# Patient Record
Sex: MALE | Race: WHITE | NOT HISPANIC OR LATINO | Employment: OTHER | URBAN - METROPOLITAN AREA
[De-identification: names, ages, dates, MRNs, and addresses within clinical notes are randomized per-mention and may not be internally consistent; named-entity substitution may affect disease eponyms.]

---

## 2017-02-14 ENCOUNTER — PATIENT MESSAGE (OUTPATIENT)
Dept: MEDICAL GROUP | Facility: MEDICAL CENTER | Age: 67
End: 2017-02-14

## 2017-02-14 DIAGNOSIS — E78.00 PURE HYPERCHOLESTEROLEMIA: ICD-10-CM

## 2017-02-15 RX ORDER — SIMVASTATIN 20 MG
20 TABLET ORAL EVERY EVENING
Qty: 90 TAB | Refills: 3 | Status: SHIPPED | OUTPATIENT
Start: 2017-02-15 | End: 2017-11-20 | Stop reason: SDUPTHER

## 2017-03-01 ENCOUNTER — OFFICE VISIT (OUTPATIENT)
Dept: MEDICAL GROUP | Facility: MEDICAL CENTER | Age: 67
End: 2017-03-01
Payer: COMMERCIAL

## 2017-03-01 VITALS
BODY MASS INDEX: 25.65 KG/M2 | SYSTOLIC BLOOD PRESSURE: 142 MMHG | OXYGEN SATURATION: 97 % | TEMPERATURE: 97.3 F | WEIGHT: 189.4 LBS | RESPIRATION RATE: 16 BRPM | DIASTOLIC BLOOD PRESSURE: 88 MMHG | HEART RATE: 78 BPM | HEIGHT: 72 IN

## 2017-03-01 DIAGNOSIS — Z87.891 HX OF TOBACCO USE, PRESENTING HAZARDS TO HEALTH: ICD-10-CM

## 2017-03-01 DIAGNOSIS — L30.9 CHRONIC ECZEMA: ICD-10-CM

## 2017-03-01 DIAGNOSIS — E55.9 VITAMIN D INSUFFICIENCY: ICD-10-CM

## 2017-03-01 DIAGNOSIS — E78.00 PURE HYPERCHOLESTEROLEMIA: ICD-10-CM

## 2017-03-01 DIAGNOSIS — Z00.00 ANNUAL PHYSICAL EXAM: ICD-10-CM

## 2017-03-01 DIAGNOSIS — M25.512 CHRONIC LEFT SHOULDER PAIN: ICD-10-CM

## 2017-03-01 DIAGNOSIS — G89.29 CHRONIC LEFT SHOULDER PAIN: ICD-10-CM

## 2017-03-01 PROCEDURE — 99396 PREV VISIT EST AGE 40-64: CPT | Performed by: FAMILY MEDICINE

## 2017-03-01 RX ORDER — DESONIDE 0.5 MG/G
CREAM TOPICAL 2 TIMES DAILY
COMMUNITY
End: 2019-03-06

## 2017-03-01 ASSESSMENT — PATIENT HEALTH QUESTIONNAIRE - PHQ9: CLINICAL INTERPRETATION OF PHQ2 SCORE: 0

## 2017-03-01 NOTE — MR AVS SNAPSHOT
"        Saul Bari Unique   3/1/2017 1:20 PM   Office Visit   MRN: [id number]    Department:  [facility]   Dept Phone:  [phone]    Description:  Male : 1950   Provider:  Naun Moody M.D.           Reason for Visit     Annual Exam           Allergies as of 3/1/2017     No Known Allergies      You were diagnosed with     Annual physical exam   [719254]       Pure hypercholesterolemia   [272.0.ICD-9-CM]       Chronic eczema   [526652]       Chronic left shoulder pain   [406152]       Vitamin D insufficiency   [609101]       Hx of tobacco use, presenting hazards to health   [940650]         Vital Signs     Blood Pressure Pulse Temperature Respirations Height Weight    142/88 mmHg 78 36.3 °C (97.3 °F) 16 1.829 m (6' 0.01\") 85.911 kg (189 lb 6.4 oz)    Body Mass Index Oxygen Saturation Smoking Status             25.68 kg/m2 97% Former Smoker         Basic Information     Date Of Birth Sex Race Ethnicity Preferred Language    1950 Male White Non- English      Problem List              ICD-10-CM Priority Class Noted - Resolved    Hyperlipidemia E78.5   3/6/2015 - Present    Chronic eczema L30.9   3/6/2015 - Present    Left shoulder pain M25.512   3/6/2015 - Present    Hx of tobacco use, presenting hazards to health Z87.891   3/6/2015 - Present    Vitamin D insufficiency E55.9   3/6/2015 - Present      Health Maintenance        Date Due Completion Dates    COLONOSCOPY 2002 ---    IMM INFLUENZA (1) 2016 ---    IMM DTaP/Tdap/Td Vaccine (2 - Td) 2026            Current Immunizations     SHINGLES VACCINE 3/15/2014    Tdap Vaccine 2016      Below and/or attached are the medications your provider expects you to take. Review all of your home medications and newly ordered medications with your provider and/or pharmacist. Follow medication instructions as directed by your provider and/or pharmacist. Please keep your medication list with you and share with your provider. " Update the information when medications are discontinued, doses are changed, or new medications (including over-the-counter products) are added; and carry medication information at all times in the event of emergency situations     Allergies:  No Known Allergies          Medications  Valid as of: March 01, 2017 -  1:44 PM    Generic Name Brand Name Tablet Size Instructions for use    Aspirin (Tablet Delayed Response) ECOTRIN 81 MG Take 1 Tab by mouth every day.        Cetirizine HCl (Tab) ZYRTEC 10 MG Take 1 Tab by mouth 1 time daily as needed (Eczema).        Cholecalciferol (Cap) Vitamin D 1000 UNIT Take 1 Cap by mouth every day.        Clotrimazole-Betamethasone (Cream) LOTRISONE 1-0.05 % Apply 1 Application to affected area(s) 2 times a day.        Desonide (Cream) TRIDESILON 0.05 % Apply  to affected area(s) 2 times a day.        Omega-3 Fatty Acids (Cap) Fish Oil 1200 MG Take 1 Cap by mouth 2 Times a Day.        Simvastatin (Tab) ZOCOR 20 MG Take 1 Tab by mouth every evening.        Triamcinolone Acetonide (Ointment) KENALOG 0.1 % Apply 1 Application to affected area(s) every day.        .                 Medicines prescribed today were sent to:     [facility] [address] [place] [address], [place] - [address]    [address] [place] [address]    Phone: [phone] Fax: [phone]    Open 24 Hours?: No      Medication refill instructions:       If your prescription bottle indicates you have medication refills left, it is not necessary to call your provider’s office. Please contact your pharmacy and they will refill your medication.    If your prescription bottle indicates you do not have any refills left, you may request refills at any time through one of the following ways: The online [employer] system (except Urgent Care), by calling your provider’s office, or by asking your pharmacy to contact your provider’s office with a refill request. Medication refills are processed only during regular business  hours and may not be available until the next business day. Your provider may request additional information or to have a follow-up visit with you prior to refilling your medication.   *Please Note: Medication refills are assigned a new Rx number when refilled electronically. Your pharmacy may indicate that no refills were authorized even though a new prescription for the same medication is available at the pharmacy. Please request the medicine by name with the pharmacy before contacting your provider for a refill.        Your To Do List     Future Labs/Procedures Complete By Expires    TASH ANTIBODY WITH REFLEX  As directed 3/2/2018    CBC WITH DIFFERENTIAL  As directed 3/2/2018    CCP ANTIBODY  As directed 3/2/2018    COMP METABOLIC PANEL  As directed 3/2/2018    LIPID PROFILE  As directed 3/2/2018    PROSTATE SPECIFIC AG SCREENING  As directed 3/2/2018    TSH WITH REFLEX TO FT4  As directed 3/1/2018    VITAMIN D,25 HYDROXY  As directed 3/2/2018         MyChart Access Code: Activation code not generated  Current [facility] Status: Active

## 2017-03-01 NOTE — PROGRESS NOTES
"Subjective:   Saul Calhoun is a 66 y.o. male here today for annual    Patient came back positive for warm agglutinins on blood donation. He denies any known autoimmune diseases. He is not on any known medications to cause positive warm agglutinins.      Hyperlipidemia  Patient is tolerating simvastatin 20 mg daily. He has not had labs done this year.    Left shoulder pain  If patient exercises, left shoulder pain is under control. He has been to physical therapy in the past.    Chronic eczema  Patient is using triamcinolone 0.1% ointment as needed for breakouts.    Vitamin D insufficiency  Patient is on vitamin D 1000 units daily.         Current medicines (including changes today)  Current Outpatient Prescriptions   Medication Sig Dispense Refill   • desonide (TRIDESILON) 0.05 % Cream Apply  to affected area(s) 2 times a day.     • simvastatin (ZOCOR) 20 MG Tab Take 1 Tab by mouth every evening. 90 Tab 3   • clotrimazole-betamethasone (LOTRISONE) 1-0.05 % Cream Apply 1 Application to affected area(s) 2 times a day. 1 Tube 2   • cetirizine (ZYRTEC) 10 MG TABS Take 1 Tab by mouth 1 time daily as needed (Eczema).     • triamcinolone acetonide (KENALOG) 0.1 % OINT Apply 1 Application to affected area(s) every day.     • Cholecalciferol (VITAMIN D) 1000 UNIT CAPS Take 1 Cap by mouth every day.     • aspirin EC (ECOTRIN) 81 MG TBEC Take 1 Tab by mouth every day.     • Omega-3 Fatty Acids (FISH OIL) 1200 MG CAPS Take 1 Cap by mouth 2 Times a Day.       No current facility-administered medications for this visit.     He  has a past medical history of Hyperlipidemia (3/6/2015) and Chronic eczema (3/6/2015).    ROS   No chest pain, no shortness of breath, no abdominal pain       Objective:     Blood pressure 142/88, pulse 78, temperature 36.3 °C (97.3 °F), resp. rate 16, height 1.829 m (6' 0.01\"), weight 85.911 kg (189 lb 6.4 oz), SpO2 97 %. Body mass index is 25.68 kg/(m^2).   Physical Exam:  Constitutional: Alert, " no distress.  Skin: Warm, dry, good turgor, no rashes in visible areas.  Eye: Equal, round and reactive, conjunctiva clear, lids normal.  ENMT: Lips without lesions, good dentition, oropharynx clear.  Neck: Trachea midline, no masses, no thyromegaly. No cervical or supraclavicular lymphadenopathy  Respiratory: Unlabored respiratory effort, lungs clear to auscultation, no wheezes, no ronchi.  Cardiovascular: Normal S1, S2, no murmur, no edema.  Abdomen: Soft, non-tender, no masses, no hepatosplenomegaly.  Psych: Alert and oriented x3, normal affect and mood.        Assessment and Plan:   The following treatment plan was discussed    1. Annual physical exam  Check labs and call with results.  We will check a autoimmune labs for positive warm agglutinins.  - COMP METABOLIC PANEL; Future  - CBC WITH DIFFERENTIAL; Future  - LIPID PROFILE; Future  - TSH WITH REFLEX TO FT4; Future  - VITAMIN D,25 HYDROXY; Future  - PROSTATE SPECIFIC AG SCREENING; Future  - CCP ANTIBODY; Future  - TASH ANTIBODY WITH REFLEX; Future    2. Pure hypercholesterolemia  Controlled. Continue current medication. Check labs and call with results.    3. Chronic eczema  Controlled. Continue current medication.     4. Chronic left shoulder pain  Controlled. Continue current exercise.    5. Vitamin D insufficiency  Continue current supplementation. Check labs    6. Hx of tobacco use, presenting hazards to health        Followup: Return in about 1 year (around 3/1/2018) for Annual, Long.

## 2017-03-01 NOTE — ASSESSMENT & PLAN NOTE
If patient exercises, left shoulder pain is under control. He has been to physical therapy in the past.

## 2017-03-13 ENCOUNTER — TELEPHONE (OUTPATIENT)
Dept: MEDICAL GROUP | Facility: MEDICAL CENTER | Age: 67
End: 2017-03-13

## 2017-03-13 NOTE — TELEPHONE ENCOUNTER
----- Message from Naun Moody M.D. sent at 3/13/2017 12:42 PM PDT -----  Please notify patient if his blood counts, vitamin D, thyroid function, kidney function, liver function normal.  There is no evidence of autoimmune disease on his labs.  His triglycerides are borderline high. We recommend decreasing saturated fat which are found in meats that come from a cow or pig, and found in creams, cheeses, butter, ann, and fried foods. We recommend more vegetables, fruits, fish, nuts, olive oil and exercising 5 times a week for 30 minutes.   Naun Moody M.D.

## 2017-03-26 ENCOUNTER — PATIENT MESSAGE (OUTPATIENT)
Dept: MEDICAL GROUP | Facility: MEDICAL CENTER | Age: 67
End: 2017-03-26

## 2017-03-26 NOTE — TELEPHONE ENCOUNTER
From: Saul Calhoun  To: Naun Moody M.D.  Sent: 3/26/2017 8:16 AM PDT  Subject: Test Result Question    I was wondering if my blood tests revealed why my [employer] donation was a Positive TOBY and rejected? Ref: Letter given to you during my annual exam.  Thank you,  Drake

## 2017-04-18 ENCOUNTER — TELEPHONE (OUTPATIENT)
Dept: MEDICAL GROUP | Facility: MEDICAL CENTER | Age: 67
End: 2017-04-18

## 2017-04-18 DIAGNOSIS — L30.9 ECZEMA, UNSPECIFIED TYPE: ICD-10-CM

## 2017-04-18 NOTE — TELEPHONE ENCOUNTER
----- Message from Michele Gilmore sent at 4/18/2017  8:24 AM PDT -----  Regarding: FW: Customer Service: Referrals  Contact: [phone]      ----- Message -----     From: Tevin Calhoun     Sent: 4/17/2017   6:41 PM       To: Amb All Mas  Subject: Customer Service: Referrals                      Customer Service request regarding: Referrals   regarding member: TEVIN CALHOUN [  [id number]]    -Note: Referrals are displayed in the format:Internal ID [External ID]    Referral #: [id number] [[id number]]    Comment:  Please let me know if my dermatology referral for [phone] was done at my last check up.  Thank you,  Drake Calhoun

## 2017-05-08 ENCOUNTER — TELEPHONE (OUTPATIENT)
Dept: MEDICAL GROUP | Facility: MEDICAL CENTER | Age: 67
End: 2017-05-08

## 2017-05-08 NOTE — TELEPHONE ENCOUNTER
----- Message from German John sent at 5/7/2017  5:00 PM PDT -----  Regarding: FW: Customer Service: Referrals  Contact: [phone]      ----- Message -----     From: Tevin Calhoun     Sent: 5/7/2017  10:31 AM       To: Jarad Mays Mas  Subject: Customer Service: Referrals                      Customer Service request regarding: Referrals   regarding member: TEVIN CALHOUN [  [id number]]    -Note: Referrals are displayed in the format:Internal ID [External ID]    Referral #: [id number] [[id number]]    Comment:  To keep my records accurate, this referral needs to be deleted and replaced with the referral you sent to [facility] on 4/24/17.  Thank you,  Drake Calhoun

## 2017-10-13 DIAGNOSIS — L30.9 CHRONIC ECZEMA: ICD-10-CM

## 2017-10-13 RX ORDER — CLOTRIMAZOLE AND BETAMETHASONE DIPROPIONATE 10; .64 MG/G; MG/G
CREAM TOPICAL
Qty: 30 G | Refills: 2 | Status: SHIPPED | OUTPATIENT
Start: 2017-10-13 | End: 2019-03-06

## 2017-11-10 ENCOUNTER — PATIENT MESSAGE (OUTPATIENT)
Dept: MEDICAL GROUP | Facility: MEDICAL CENTER | Age: 67
End: 2017-11-10

## 2017-11-10 NOTE — TELEPHONE ENCOUNTER
From: Saul Calhoun  To: Naun Moody M.D.  Sent: 11/10/2017 10:38 AM PST  Subject: Non-Urgent Medical Question    Please let us know if you think the [employer] Screening program is a worthwhile preventive screening program to participate in. We had it done back on 8-12-12 and are considering doing it again.  Thank you,  Drake & Anne-Marie Calhoun

## 2017-11-20 ENCOUNTER — PATIENT MESSAGE (OUTPATIENT)
Dept: MEDICAL GROUP | Facility: MEDICAL CENTER | Age: 67
End: 2017-11-20

## 2017-11-20 DIAGNOSIS — E78.00 PURE HYPERCHOLESTEROLEMIA: ICD-10-CM

## 2017-11-20 RX ORDER — SIMVASTATIN 20 MG
20 TABLET ORAL EVERY EVENING
Qty: 90 TAB | Refills: 3 | Status: SHIPPED | OUTPATIENT
Start: 2017-11-20 | End: 2018-11-12 | Stop reason: SDUPTHER

## 2017-11-20 NOTE — TELEPHONE ENCOUNTER
From: Saul Calhoun  To: Naun Moody M.D.  Sent: 11/20/2017 8:57 AM PST  Subject: Prescription Question    Can you transfer my prescription for Simvastatin 20 MG from [facility] to the [facility] on [address] [facility]?  Thank you,   Drake Calhoun

## 2018-03-06 ENCOUNTER — OFFICE VISIT (OUTPATIENT)
Dept: MEDICAL GROUP | Facility: MEDICAL CENTER | Age: 68
End: 2018-03-06
Payer: MEDICARE

## 2018-03-06 VITALS
RESPIRATION RATE: 14 BRPM | TEMPERATURE: 97.6 F | SYSTOLIC BLOOD PRESSURE: 136 MMHG | OXYGEN SATURATION: 95 % | WEIGHT: 184.2 LBS | DIASTOLIC BLOOD PRESSURE: 90 MMHG | HEART RATE: 89 BPM | HEIGHT: 72 IN | BODY MASS INDEX: 24.95 KG/M2

## 2018-03-06 DIAGNOSIS — Z97.4 HEARING AID WORN: ICD-10-CM

## 2018-03-06 DIAGNOSIS — I71.40 ABDOMINAL AORTIC ANEURYSM (AAA) WITHOUT RUPTURE (HCC): ICD-10-CM

## 2018-03-06 DIAGNOSIS — E55.9 VITAMIN D INSUFFICIENCY: ICD-10-CM

## 2018-03-06 DIAGNOSIS — E78.00 PURE HYPERCHOLESTEROLEMIA: ICD-10-CM

## 2018-03-06 DIAGNOSIS — L30.9 CHRONIC ECZEMA: ICD-10-CM

## 2018-03-06 DIAGNOSIS — Z23 NEED FOR VACCINATION: ICD-10-CM

## 2018-03-06 DIAGNOSIS — M25.512 CHRONIC LEFT SHOULDER PAIN: ICD-10-CM

## 2018-03-06 DIAGNOSIS — Z87.891 HX OF TOBACCO USE, PRESENTING HAZARDS TO HEALTH: ICD-10-CM

## 2018-03-06 DIAGNOSIS — G89.29 CHRONIC LEFT SHOULDER PAIN: ICD-10-CM

## 2018-03-06 DIAGNOSIS — Z00.00 MEDICARE ANNUAL WELLNESS VISIT, INITIAL: ICD-10-CM

## 2018-03-06 DIAGNOSIS — H91.13 PRESBYCUSIS OF BOTH EARS: ICD-10-CM

## 2018-03-06 PROCEDURE — 90670 PCV13 VACCINE IM: CPT | Performed by: FAMILY MEDICINE

## 2018-03-06 PROCEDURE — G0403 EKG FOR INITIAL PREVENT EXAM: HCPCS | Performed by: FAMILY MEDICINE

## 2018-03-06 PROCEDURE — G0402 INITIAL PREVENTIVE EXAM: HCPCS | Mod: 25 | Performed by: FAMILY MEDICINE

## 2018-03-06 PROCEDURE — G0009 ADMIN PNEUMOCOCCAL VACCINE: HCPCS | Performed by: FAMILY MEDICINE

## 2018-03-06 ASSESSMENT — PATIENT HEALTH QUESTIONNAIRE - PHQ9: CLINICAL INTERPRETATION OF PHQ2 SCORE: 0

## 2018-03-06 ASSESSMENT — ACTIVITIES OF DAILY LIVING (ADL): BATHING_REQUIRES_ASSISTANCE: 0

## 2018-03-06 NOTE — PROGRESS NOTES
Chief Complaint   Patient presents with   • Annual Exam         HPI:  Saul Calhoun is a 67 y.o. here for Medicare Annual Wellness Visit     Patient Active Problem List    Diagnosis Date Noted   • Hearing aid worn 03/06/2018   • Presbycusis of both ears 03/06/2018   • Abdominal aortic aneurysm (AAA) without rupture (CMS-HCC) 03/06/2018   • Hyperlipidemia 03/06/2015   • Chronic eczema 03/06/2015   • Left shoulder pain 03/06/2015   • Hx of tobacco use, presenting hazards to health 03/06/2015   • Vitamin D insufficiency 03/06/2015       Current Outpatient Prescriptions   Medication Sig Dispense Refill   • simvastatin (ZOCOR) 20 MG Tab Take 1 Tab by mouth every evening. 90 Tab 3   • clotrimazole-betamethasone (LOTRISONE) 1-0.05 % Cream APPLY ONE APPLICATION TOPICALLY TO AFFECTED AREA(S) TWO TIMES A DAY 30 g 2   • desonide (TRIDESILON) 0.05 % Cream Apply  to affected area(s) 2 times a day.     • cetirizine (ZYRTEC) 10 MG TABS Take 1 Tab by mouth 1 time daily as needed (Eczema).     • triamcinolone acetonide (KENALOG) 0.1 % OINT Apply 1 Application to affected area(s) every day.     • Cholecalciferol (VITAMIN D) 1000 UNIT CAPS Take 1 Cap by mouth every day.     • aspirin EC (ECOTRIN) 81 MG TBEC Take 1 Tab by mouth every day.     • Omega-3 Fatty Acids (FISH OIL) 1200 MG CAPS Take 1 Cap by mouth 2 Times a Day.       No current facility-administered medications for this visit.             Current supplements as per medication list.       Allergies: Patient has no known allergies.    Current social contact/activities: Just retired recently. Lives with wife. 4 children in [facility].    He  reports that he has quit smoking. His smoking use included Cigarettes. He has a 17.50 pack-year smoking history. He has never used smokeless tobacco. He reports that he drinks about 7.0 oz of alcohol per week . He reports that he does not use drugs.  Counseling given: Not Answered        DPA/Advanced Directive:  Patient has  Advanced Directive on file.       ROS:    Gait: Uses no assistive device   Ostomy: no   Other tubes: no   Amputations: no   Chronic oxygen use: no   Last eye exam: 6-7 months ago.  : Denies any urinary leakage during the last 6 months incontinence.         Depression Screening    Little interest or pleasure in doing things?  0 - not at all  Feeling down, depressed , or hopeless? 0 - not at all  Patient Health Questionnaire Score: 0     If depressive symptoms identified deferred to follow up visit unless specifically addressed in assessment and plan.    Interpretation of PHQ-9 Total Score   Score Severity   1-4 No Depression   5-9 Mild Depression   10-14 Moderate Depression   15-19 Moderately Severe Depression   20-27 Severe Depression    Screening for Cognitive Impairment    Three Minute Recall (apple, watch, rafi)  2/3    Draw clock face with all 12 numbers set to the hand to show 10 minutes past 11 o'clock  1    Cognitive concerns identified deferred for follow up unless specifically addressed in assessment and plan.    Fall Risk Assessment    Has the patient had two or more falls in the last year or any fall with injury in the last year?  No    Safety Assessment    Throw rugs on floor.  Yes  Handrails on all stairs.  No  Good lighting in all hallways.  Yes  Difficulty hearing.  Yes  Patient counseled about all safety risks that were identified.    Functional Assessment ADLs    Are there any barriers preventing you from cooking for yourself or meeting nutritional needs?  No.    Are there any barriers preventing you from driving safely or obtaining transportation?  No.    Are there any barriers preventing you from using a telephone or calling for help?  No.    Are there any barriers preventing you from shopping?  No.    Are there any barriers preventing you from taking care of your own finances?  No.    Are there any barriers preventing you from managing your medications?  No.    Are there any barriers  "preventing you from showering, bathing or dressing yourself?  No.    Are currently engaging any exercise or physical activity?  Yes.       Health Maintenance Summary                Annual Wellness Visit Overdue 1950     COLONOSCOPY Overdue 5/6/2002     IMM PNEUMOCOCCAL 65+ (ADULT) LOW/MEDIUM RISK SERIES Overdue 5/6/2017     IMM INFLUENZA Overdue 9/1/2017     IMM DTaP/Tdap/Td Vaccine Next Due 2/23/2026      Done 2/23/2016 Imm Admin: Tdap Vaccine          Patient Care Team:  Naun Moody M.D. as PCP - General (Family Medicine)        Social History   Substance Use Topics   • Smoking status: Former Smoker     Packs/day: 0.50     Years: 35.00     Types: Cigarettes   • Smokeless tobacco: Never Used      Comment: Quit 2012   • Alcohol use 7.0 oz/week     14 Cans of beer per week     Family History   Problem Relation Age of Onset   • Hyperlipidemia Mother    • Hyperlipidemia Maternal Uncle    • Heart Disease Maternal Uncle 50     He  has a past medical history of Chronic eczema (3/6/2015) and Hyperlipidemia (3/6/2015).   Past Surgical History:   Procedure Laterality Date   • DRAINAGE HEMATOMA  1968    Right groin       Exam:     Blood pressure 136/90, pulse 89, temperature 36.4 °C (97.6 °F), resp. rate 14, height 1.829 m (6' 0.01\"), weight 83.6 kg (184 lb 3.2 oz), SpO2 95 %. Body mass index is 24.98 kg/m².    Hearing poor.    Alert, oriented in no acute distress.  Eye contact is good, speech goal directed, affect calm    EKG Interpretation-HR is 77 normal EKG, normal sinus rhythm      Assessment and Plan. The following treatment and monitoring plan is recommended:    1. Medicare annual wellness visit, initial  Overall doing well for age.  - Initial Wellness Visit - Includes PPPS ()  - EKG    2. Pure hypercholesterolemia  Check labs and continue simvastatin.  - COMP METABOLIC PANEL; Future  - LIPID PROFILE; Future  - TSH WITH REFLEX TO FT4; Future  - Initial Wellness Visit - Includes PPPS ()    3. Vitamin D " insufficiency  Continue vitamin D  - Initial Wellness Visit - Includes PPPS ()    4. Hx of tobacco use, presenting hazards to health  Continue to remain smoke free.  - Initial Wellness Visit - Includes PPPS ()    5. Chronic left shoulder pain  Not bothersome, continue to monitor.  - Initial Wellness Visit - Includes PPPS ()    6. Chronic eczema  Controlled with desonide.  - Initial Wellness Visit - Includes PPPS ()    7. Abdominal aortic aneurysm (AAA) without rupture (CMS-HCC)  Check Ultrasound and call with results.  - Initial Wellness Visit - Includes PPPS ()  - US-AORTA; Future    8. Hearing aid worn  Stable.  - Initial Wellness Visit - Includes PPPS ()    9. Presbycusis of both ears  Improved with hearing aids.  - Initial Wellness Visit - Includes PPPS ()    10. Need for vaccination  - Initial Wellness Visit - Includes PPPS ()  - PNEUMOCOCCAL CONJUGATE VACCINE 13-VALENT        Services suggested: No services needed at this time  Health Care Screening: Age-appropriate preventive services Medicare covers discussed today and ordered if indicated.  Referrals offered: Community-based lifestyle interventions to reduce health risks and promote self-management and wellness, fall prevention, nutrition, physical activity, tobacco-use cessation, weight loss, and mental health services as per orders if indicated.    Discussion today about general wellness and lifestyle habits:    · Prevent falls and reduce trip hazards; Cautioned about securing or removing rugs.  · Have a working fire alarm and carbon monoxide detector;   · Engage in regular physical activity and social activities       Follow-up: Return in about 1 year (around 3/6/2019) for Annual.

## 2018-03-09 ENCOUNTER — HOSPITAL ENCOUNTER (OUTPATIENT)
Dept: RADIOLOGY | Facility: MEDICAL CENTER | Age: 68
End: 2018-03-09
Attending: FAMILY MEDICINE
Payer: MEDICARE

## 2018-03-09 ENCOUNTER — HOSPITAL ENCOUNTER (OUTPATIENT)
Dept: LAB | Facility: MEDICAL CENTER | Age: 68
End: 2018-03-09
Attending: FAMILY MEDICINE
Payer: MEDICARE

## 2018-03-09 DIAGNOSIS — I71.40 ABDOMINAL AORTIC ANEURYSM (AAA) WITHOUT RUPTURE (HCC): ICD-10-CM

## 2018-03-09 DIAGNOSIS — E78.00 PURE HYPERCHOLESTEROLEMIA: ICD-10-CM

## 2018-03-09 LAB
ALBUMIN SERPL BCP-MCNC: 4.3 G/DL (ref 3.2–4.9)
ALBUMIN/GLOB SERPL: 1.3 G/DL
ALP SERPL-CCNC: 52 U/L (ref 30–99)
ALT SERPL-CCNC: 20 U/L (ref 2–50)
ANION GAP SERPL CALC-SCNC: 6 MMOL/L (ref 0–11.9)
AST SERPL-CCNC: 19 U/L (ref 12–45)
BILIRUB SERPL-MCNC: 0.7 MG/DL (ref 0.1–1.5)
BUN SERPL-MCNC: 11 MG/DL (ref 8–22)
CALCIUM SERPL-MCNC: 9.4 MG/DL (ref 8.5–10.5)
CHLORIDE SERPL-SCNC: 105 MMOL/L (ref 96–112)
CHOLEST SERPL-MCNC: 167 MG/DL (ref 100–199)
CO2 SERPL-SCNC: 28 MMOL/L (ref 20–33)
CREAT SERPL-MCNC: 0.78 MG/DL (ref 0.5–1.4)
GLOBULIN SER CALC-MCNC: 3.4 G/DL (ref 1.9–3.5)
GLUCOSE SERPL-MCNC: 94 MG/DL (ref 65–99)
HDLC SERPL-MCNC: 60 MG/DL
LDLC SERPL CALC-MCNC: 90 MG/DL
POTASSIUM SERPL-SCNC: 4.3 MMOL/L (ref 3.6–5.5)
PROT SERPL-MCNC: 7.7 G/DL (ref 6–8.2)
SODIUM SERPL-SCNC: 139 MMOL/L (ref 135–145)
TRIGL SERPL-MCNC: 85 MG/DL (ref 0–149)
TSH SERPL DL<=0.005 MIU/L-ACNC: 2.31 UIU/ML (ref 0.38–5.33)

## 2018-03-09 PROCEDURE — 36415 COLL VENOUS BLD VENIPUNCTURE: CPT

## 2018-03-09 PROCEDURE — 76775 US EXAM ABDO BACK WALL LIM: CPT

## 2018-03-09 PROCEDURE — 80053 COMPREHEN METABOLIC PANEL: CPT

## 2018-03-09 PROCEDURE — 84443 ASSAY THYROID STIM HORMONE: CPT

## 2018-03-09 PROCEDURE — 80061 LIPID PANEL: CPT

## 2018-10-23 ENCOUNTER — NON-PROVIDER VISIT (OUTPATIENT)
Dept: MEDICAL GROUP | Facility: MEDICAL CENTER | Age: 68
End: 2018-10-23
Payer: MEDICARE

## 2018-10-23 DIAGNOSIS — Z23 NEED FOR VACCINATION: ICD-10-CM

## 2018-10-23 PROCEDURE — G0008 ADMIN INFLUENZA VIRUS VAC: HCPCS | Performed by: FAMILY MEDICINE

## 2018-10-23 PROCEDURE — 90662 IIV NO PRSV INCREASED AG IM: CPT | Performed by: FAMILY MEDICINE

## 2018-10-23 NOTE — PROGRESS NOTES
"Saul Calhoun is a 68 y.o. male here for a non-provider visit for:   FLU    Reason for immunization: Annual Flu Vaccine  Immunization records indicate need for vaccine: Yes, confirmed with Epic  Minimum interval has been met for this vaccine: Yes  ABN completed: No    Order and dose verified by: ANDREIA  VIS Dated  8/7/15 was given to patient: Yes  All IAC Questionnaire questions were answered \"No.\"    Patient tolerated injection and no adverse effects were observed or reported: Yes    Pt scheduled for next dose in series: No  "

## 2018-11-12 DIAGNOSIS — E78.00 PURE HYPERCHOLESTEROLEMIA: ICD-10-CM

## 2018-11-12 RX ORDER — SIMVASTATIN 20 MG
TABLET ORAL
Qty: 90 TAB | Refills: 3 | Status: SHIPPED | OUTPATIENT
Start: 2018-11-12 | End: 2019-11-08 | Stop reason: SDUPTHER

## 2019-03-06 ENCOUNTER — OFFICE VISIT (OUTPATIENT)
Dept: MEDICAL GROUP | Facility: MEDICAL CENTER | Age: 69
End: 2019-03-06
Payer: MEDICARE

## 2019-03-06 VITALS
OXYGEN SATURATION: 97 % | TEMPERATURE: 98.2 F | HEIGHT: 72 IN | DIASTOLIC BLOOD PRESSURE: 86 MMHG | WEIGHT: 184 LBS | BODY MASS INDEX: 24.92 KG/M2 | SYSTOLIC BLOOD PRESSURE: 126 MMHG | HEART RATE: 77 BPM

## 2019-03-06 DIAGNOSIS — L30.9 CHRONIC ECZEMA: ICD-10-CM

## 2019-03-06 DIAGNOSIS — Z23 NEED FOR VACCINATION: ICD-10-CM

## 2019-03-06 DIAGNOSIS — G89.29 CHRONIC LEFT SHOULDER PAIN: ICD-10-CM

## 2019-03-06 DIAGNOSIS — E55.9 VITAMIN D INSUFFICIENCY: ICD-10-CM

## 2019-03-06 DIAGNOSIS — H91.13 PRESBYCUSIS OF BOTH EARS: ICD-10-CM

## 2019-03-06 DIAGNOSIS — E78.00 PURE HYPERCHOLESTEROLEMIA: ICD-10-CM

## 2019-03-06 DIAGNOSIS — M25.512 CHRONIC LEFT SHOULDER PAIN: ICD-10-CM

## 2019-03-06 DIAGNOSIS — Z12.11 COLON CANCER SCREENING: ICD-10-CM

## 2019-03-06 DIAGNOSIS — Z87.891 HX OF TOBACCO USE, PRESENTING HAZARDS TO HEALTH: ICD-10-CM

## 2019-03-06 DIAGNOSIS — Z00.00 MEDICARE ANNUAL WELLNESS VISIT, SUBSEQUENT: ICD-10-CM

## 2019-03-06 DIAGNOSIS — Z97.4 HEARING AID WORN: ICD-10-CM

## 2019-03-06 DIAGNOSIS — I71.40 ABDOMINAL AORTIC ANEURYSM (AAA) WITHOUT RUPTURE (HCC): ICD-10-CM

## 2019-03-06 PROCEDURE — 90732 PPSV23 VACC 2 YRS+ SUBQ/IM: CPT | Performed by: FAMILY MEDICINE

## 2019-03-06 PROCEDURE — G0439 PPPS, SUBSEQ VISIT: HCPCS | Performed by: FAMILY MEDICINE

## 2019-03-06 PROCEDURE — 90472 IMMUNIZATION ADMIN EACH ADD: CPT | Performed by: FAMILY MEDICINE

## 2019-03-06 PROCEDURE — G0009 ADMIN PNEUMOCOCCAL VACCINE: HCPCS | Performed by: FAMILY MEDICINE

## 2019-03-06 PROCEDURE — 90750 HZV VACC RECOMBINANT IM: CPT | Performed by: FAMILY MEDICINE

## 2019-03-06 ASSESSMENT — ENCOUNTER SYMPTOMS: GENERAL WELL-BEING: GOOD

## 2019-03-06 ASSESSMENT — PATIENT HEALTH QUESTIONNAIRE - PHQ9: CLINICAL INTERPRETATION OF PHQ2 SCORE: 0

## 2019-03-06 ASSESSMENT — ACTIVITIES OF DAILY LIVING (ADL): BATHING_REQUIRES_ASSISTANCE: 0

## 2019-03-06 NOTE — PROGRESS NOTES
Chief Complaint   Patient presents with   • Annual Exam     colonoscopy         HPI:  Saul Calhoun is a 68 y.o. here for Medicare Annual Wellness Visit     Patient Active Problem List    Diagnosis Date Noted   • Hearing aid worn 03/06/2018   • Presbycusis of both ears 03/06/2018   • Abdominal aortic aneurysm (AAA) without rupture (HCC) 03/06/2018   • Hyperlipidemia 03/06/2015   • Chronic eczema 03/06/2015   • Left shoulder pain 03/06/2015   • Hx of tobacco use, presenting hazards to health 03/06/2015   • Vitamin D insufficiency 03/06/2015       Current Outpatient Prescriptions   Medication Sig Dispense Refill   • Zoster Vac Recomb Adjuvanted (SHINGRIX) 50 MCG Recon Susp 0.5 mL by Intramuscular route Once for 1 dose. 0.5 mL 0   • simvastatin (ZOCOR) 20 MG Tab TAKE 1 TABLET BY MOUTH EVERY EVENING 90 Tab 3   • cetirizine (ZYRTEC) 10 MG TABS Take 1 Tab by mouth 1 time daily as needed (Eczema).     • triamcinolone acetonide (KENALOG) 0.1 % OINT Apply 1 Application to affected area(s) every day.     • Cholecalciferol (VITAMIN D) 1000 UNIT CAPS Take 1 Cap by mouth every day.     • aspirin EC (ECOTRIN) 81 MG TBEC Take 1 Tab by mouth every day.     • Omega-3 Fatty Acids (FISH OIL) 1200 MG CAPS Take 1 Cap by mouth 2 Times a Day.       No current facility-administered medications for this visit.             Current supplements as per medication list.       Allergies: Patient has no known allergies.    Current social contact/activities:   Just retired recently. Lives with wife. 4 children in [facility].    He  reports that he has quit smoking. His smoking use included Cigarettes. He has a 17.50 pack-year smoking history. He has never used smokeless tobacco. He reports that he drinks about 7.0 oz of alcohol per week . He reports that he does not use drugs.  Counseling given: Not Answered      DPA/Advanced Directive:  Patient has Advanced Directive on file.     ROS:    Gait: Uses no assistive device  Ostomy: No  Other  tubes: No  Amputations: No  Chronic oxygen use: No  Last eye exam: 1.5 year ago.  Wears hearing aids: Yes   : Denies any urinary leakage during the last 6 months      Depression Screening    Little interest or pleasure in doing things?  0 - not at all  Feeling down, depressed , or hopeless? 0 - not at all  Patient Health Questionnaire Score: 0     If depressive symptoms identified deferred to follow up visit unless specifically addressed in assessment and plan.    Interpretation of PHQ-9 Total Score   Score Severity   1-4 No Depression   5-9 Mild Depression   10-14 Moderate Depression   15-19 Moderately Severe Depression   20-27 Severe Depression    Screening for Cognitive Impairment    Three Minute Recall (leader, season, table) 2/3    Riley clock face with all 12 numbers and set the hands to show 10 past 11.  Yes    Cognitive concerns identified deferred for follow up unless specifically addressed in assessment and plan.    Fall Risk Assessment    Has the patient had two or more falls in the last year or any fall with injury in the last year?  No    Safety Assessment    Throw rugs on floor.  Yes  Handrails on all stairs.  Yes  Good lighting in all hallways.  Yes  Difficulty hearing.  Yes  Patient counseled about all safety risks that were identified.    Functional Assessment ADLs    Are there any barriers preventing you from cooking for yourself or meeting nutritional needs?  No.    Are there any barriers preventing you from driving safely or obtaining transportation?  No.    Are there any barriers preventing you from using a telephone or calling for help?  No.    Are there any barriers preventing you from shopping?  No.    Are there any barriers preventing you from taking care of your own finances?  No.    Are there any barriers preventing you from managing your medications?  No.    Are there any barriers preventing you from showering, bathing or dressing yourself?  No.    Are you currently engaging in any  exercise or physical activity?  Yes.     What is your perception of your health?  Good.      Health Maintenance Summary                IMM ZOSTER VACCINES Overdue 5/10/2014      Done 3/15/2014 Imm Admin: Zoster Vaccine Live (ZVL) (Zostavax)    IMM PNEUMOCOCCAL 65+ (ADULT) LOW/MEDIUM RISK SERIES Overdue 3/6/2019      Done 3/6/2018 Imm Admin: Pneumococcal Conjugate Vaccine (Prevnar/PCV-13)    COLONOSCOPY Next Due 6/11/2019      Done 6/11/2009 REFERRAL TO GI FOR COLONOSCOPY    IMM DTaP/Tdap/Td Vaccine Next Due 2/23/2026      Done 2/23/2016 Imm Admin: Tdap Vaccine          Patient Care Team:  Naun Moody M.D. as PCP - General (Family Medicine)        Social History   Substance Use Topics   • Smoking status: Former Smoker     Packs/day: 0.50     Years: 35.00     Types: Cigarettes   • Smokeless tobacco: Never Used      Comment: Quit 2012   • Alcohol use 7.0 oz/week     14 Cans of beer per week     Family History   Problem Relation Age of Onset   • Hyperlipidemia Mother    • Hyperlipidemia Maternal Uncle    • Heart Disease Maternal Uncle 50     He  has a past medical history of Chronic eczema (3/6/2015) and Hyperlipidemia (3/6/2015).   Past Surgical History:   Procedure Laterality Date   • DRAINAGE HEMATOMA  1968    Right groin       Exam:   Blood pressure 126/86, pulse 77, temperature 36.8 °C (98.2 °F), height 1.829 m (6'), weight 83.5 kg (184 lb), SpO2 97 %. Body mass index is 24.95 kg/m².    Constitutional: Alert, no distress.  Skin: Warm, dry, good turgor, no rashes in visible areas.  Eye: Equal, round and reactive, conjunctiva clear, lids normal.  Respiratory: Unlabored respiratory effort, lungs clear to auscultation, no wheezes, no ronchi.  Cardiovascular: Normal S1, S2, no murmur, no edema.  Psych: Alert and oriented x3, normal affect and mood.      Assessment and Plan. The following treatment and monitoring plan is recommended:    1. Medicare annual wellness visit, subsequent  Advised healthy lifestyle.    2.  Abdominal aortic aneurysm (AAA) without rupture (HCC)  Slight increase last year. Recheck AAA this year and call with results.  - US-ABDOMINAL AORTA W/O DOPPLER    3. Pure hypercholesterolemia  Controlled based on last years labs, recheck next year.    4. Vitamin D insufficiency  Stable.  Continue to monitor.    5. Colon cancer screening  - REFERRAL TO GI FOR COLONOSCOPY    6. Need for vaccination  - Pneumococal Polysaccharide Vaccine 23-Valent =>3YO SQ/IM  - Zoster Vac Recomb Adjuvanted (SHINGRIX) 50 MCG Recon Susp; 0.5 mL by Intramuscular route Once for 1 dose.  Dispense: 0.5 mL; Refill: 0    7. Chronic eczema  Controlled.  Continue regular moisturizer and as needed triamcinolone.    8. Presbycusis of both ears  Controlled.  Continue hearing aids.    9. Hearing aid worn  Stable. Continue hearing aids.    10. Hx of tobacco use, presenting hazards to health  No recurrence.    11. Chronic left shoulder pain  Works out for 40 minutes every morning and does a lot of stretching and exercises that do help.        Services suggested: No services needed at this time  Health Care Screening: Age-appropriate preventive services recommended by USPTF and ACIP covered by Medicare were discussed today. Services ordered if indicated and agreed upon by the patient.  Referrals offered: Community-based lifestyle interventions to reduce health risks and promote self-management and wellness, fall prevention, nutrition, physical activity, tobacco-use cessation, weight loss, and mental health services as per orders if indicated.    Discussion today about general wellness and lifestyle habits:    · Prevent falls and reduce trip hazards; Cautioned about securing or removing rugs.  · Have a working fire alarm and carbon monoxide detector;   · Engage in regular physical activity and social activities     Follow-up: Return in about 1 year (around 3/6/2020) for Annual.

## 2019-03-13 ENCOUNTER — HOSPITAL ENCOUNTER (OUTPATIENT)
Dept: RADIOLOGY | Facility: MEDICAL CENTER | Age: 69
End: 2019-03-13
Attending: FAMILY MEDICINE
Payer: MEDICARE

## 2019-03-13 PROCEDURE — 76775 US EXAM ABDO BACK WALL LIM: CPT

## 2019-03-14 ENCOUNTER — TELEPHONE (OUTPATIENT)
Dept: MEDICAL GROUP | Facility: MEDICAL CENTER | Age: 69
End: 2019-03-14

## 2019-03-14 DIAGNOSIS — I71.40 ABDOMINAL AORTIC ANEURYSM (AAA) WITHOUT RUPTURE (HCC): ICD-10-CM

## 2019-03-14 NOTE — TELEPHONE ENCOUNTER
----- Message from Naun Moody M.D. sent at 3/14/2019  8:51 AM PDT -----  Please notify patient that unfortunately the ultrasound of his abdominal aorta was technically difficult to evaluate so they have recommended a CT scan to get a better look.  Please ask patient if he would like me to order a CT scan.  Naun Moody M.D.

## 2019-04-02 ENCOUNTER — TELEPHONE (OUTPATIENT)
Dept: MEDICAL GROUP | Facility: MEDICAL CENTER | Age: 69
End: 2019-04-02

## 2019-04-02 DIAGNOSIS — I71.40 ABDOMINAL AORTIC ANEURYSM (AAA) WITHOUT RUPTURE (HCC): ICD-10-CM

## 2019-04-02 DIAGNOSIS — E78.00 PURE HYPERCHOLESTEROLEMIA: ICD-10-CM

## 2019-04-02 NOTE — TELEPHONE ENCOUNTER
----- Message from Padmini Nieves sent at 4/2/2019 10:26 AM PDT -----  Regarding: Need order for Bun/creatinine   Hello, for patient's upcoming CT scan on Friday he will need a Bun/creatinine prior, can you put an order in for that please? The patient is aware and will come in to [facility] to get those done. Thank you.

## 2019-04-04 ENCOUNTER — HOSPITAL ENCOUNTER (OUTPATIENT)
Dept: LAB | Facility: MEDICAL CENTER | Age: 69
End: 2019-04-04
Attending: FAMILY MEDICINE
Payer: MEDICARE

## 2019-04-04 DIAGNOSIS — I71.40 ABDOMINAL AORTIC ANEURYSM (AAA) WITHOUT RUPTURE (HCC): ICD-10-CM

## 2019-04-04 DIAGNOSIS — E78.00 PURE HYPERCHOLESTEROLEMIA: ICD-10-CM

## 2019-04-04 LAB
ANION GAP SERPL CALC-SCNC: 10 MMOL/L (ref 0–11.9)
BUN SERPL-MCNC: 12 MG/DL (ref 8–22)
CALCIUM SERPL-MCNC: 9.2 MG/DL (ref 8.5–10.5)
CHLORIDE SERPL-SCNC: 106 MMOL/L (ref 96–112)
CO2 SERPL-SCNC: 25 MMOL/L (ref 20–33)
CREAT SERPL-MCNC: 0.73 MG/DL (ref 0.5–1.4)
FASTING STATUS PATIENT QL REPORTED: NORMAL
GLUCOSE SERPL-MCNC: 86 MG/DL (ref 65–99)
POTASSIUM SERPL-SCNC: 3.9 MMOL/L (ref 3.6–5.5)
SODIUM SERPL-SCNC: 141 MMOL/L (ref 135–145)

## 2019-04-04 PROCEDURE — 80048 BASIC METABOLIC PNL TOTAL CA: CPT

## 2019-04-04 PROCEDURE — 36415 COLL VENOUS BLD VENIPUNCTURE: CPT

## 2019-04-05 ENCOUNTER — HOSPITAL ENCOUNTER (OUTPATIENT)
Dept: RADIOLOGY | Facility: MEDICAL CENTER | Age: 69
End: 2019-04-05
Attending: FAMILY MEDICINE
Payer: MEDICARE

## 2019-04-05 DIAGNOSIS — I71.40 ABDOMINAL AORTIC ANEURYSM (AAA) WITHOUT RUPTURE (HCC): ICD-10-CM

## 2019-04-05 PROCEDURE — 700117 HCHG RX CONTRAST REV CODE 255: Performed by: FAMILY MEDICINE

## 2019-04-05 PROCEDURE — 74174 CTA ABD&PLVS W/CONTRAST: CPT

## 2019-04-05 RX ADMIN — IOHEXOL 100 ML: 350 INJECTION, SOLUTION INTRAVENOUS at 15:55

## 2019-04-08 ENCOUNTER — TELEPHONE (OUTPATIENT)
Dept: MEDICAL GROUP | Facility: MEDICAL CENTER | Age: 69
End: 2019-04-08

## 2019-04-08 NOTE — TELEPHONE ENCOUNTER
Phone Number Called: [phone] (home)       Message: pt scheduled       Left Message for patient to call back: N\A

## 2019-04-08 NOTE — TELEPHONE ENCOUNTER
----- Message from Naun Moody M.D. sent at 4/8/2019  7:17 AM PDT -----  Please have patient schedule appointment to discuss recent CT scan results, because there is a lot of information on the scan although nothing looks serious. Not urgent or critical.  Naun Moody MD

## 2019-04-15 ENCOUNTER — OFFICE VISIT (OUTPATIENT)
Dept: MEDICAL GROUP | Facility: MEDICAL CENTER | Age: 69
End: 2019-04-15
Payer: MEDICARE

## 2019-04-15 VITALS
SYSTOLIC BLOOD PRESSURE: 134 MMHG | DIASTOLIC BLOOD PRESSURE: 78 MMHG | HEIGHT: 72 IN | OXYGEN SATURATION: 96 % | TEMPERATURE: 98.6 F | HEART RATE: 82 BPM | BODY MASS INDEX: 24.87 KG/M2 | WEIGHT: 183.6 LBS

## 2019-04-15 DIAGNOSIS — N43.3 HYDROCELE IN ADULT: ICD-10-CM

## 2019-04-15 DIAGNOSIS — J43.8 OTHER EMPHYSEMA (HCC): ICD-10-CM

## 2019-04-15 DIAGNOSIS — N28.1 RENAL CYST, LEFT: ICD-10-CM

## 2019-04-15 DIAGNOSIS — D73.4 SPLENIC CYST: ICD-10-CM

## 2019-04-15 DIAGNOSIS — L98.9 SKIN LESION OF LEFT LEG: ICD-10-CM

## 2019-04-15 DIAGNOSIS — I71.40 ABDOMINAL AORTIC ANEURYSM (AAA) WITHOUT RUPTURE (HCC): ICD-10-CM

## 2019-04-15 PROCEDURE — 99214 OFFICE O/P EST MOD 30 MIN: CPT | Performed by: FAMILY MEDICINE

## 2019-04-15 RX ORDER — CLOBETASOL PROPIONATE 0.5 MG/G
1 CREAM TOPICAL 2 TIMES DAILY
Qty: 1 TUBE | Refills: 0 | Status: SHIPPED
Start: 2019-04-15 | End: 2020-03-06

## 2019-04-15 NOTE — ASSESSMENT & PLAN NOTE
Has known eczema. Skin lesion started February and has grown in size for no known reason. No initial trauma. Developed silver scaly rash since. Has not tried to use anything on it. He feels it has stopped growing. Dermatology appointment in 2 months.

## 2019-04-15 NOTE — PROGRESS NOTES
Subjective:   Saul Calhoun Jr. is a 68 y.o. male here today for skin lesion and CT scan results for AAA    Skin lesion of left leg  Has known eczema. Skin lesion started February and has grown in size for no known reason. No initial trauma. Developed silver scaly rash since. Has not tried to use anything on it. He feels it has stopped growing. Dermatology appointment in 2 months.       Reviewed CT scan results with patient.  AAA has possibly enlarged slightly from last year going from 3.5 cm to now 3.8 cm.  He is not smoking, on simvastatin plus aspirin and blood pressure is in normal range without medication.    Patient was also found to have renal cyst and splenic cyst, which he was unaware of.    He also has on and off bleeding hemorrhoids.  Patient has a colonoscopy scheduled.    Long history of smoking and there is evidence of emphysema on CT scan.  Patient denies any shortness of breath.    Current medicines (including changes today)  Current Outpatient Prescriptions   Medication Sig Dispense Refill   • clobetasol (TEMOVATE) 0.05 % Cream Apply 1 Application to affected area(s) 2 times a day. Left knee 1 Tube 0   • simvastatin (ZOCOR) 20 MG Tab TAKE 1 TABLET BY MOUTH EVERY EVENING 90 Tab 3   • cetirizine (ZYRTEC) 10 MG TABS Take 1 Tab by mouth 1 time daily as needed (Eczema).     • triamcinolone acetonide (KENALOG) 0.1 % OINT Apply 1 Application to affected area(s) every day.     • Cholecalciferol (VITAMIN D) 1000 UNIT CAPS Take 1 Cap by mouth every day.     • aspirin EC (ECOTRIN) 81 MG TBEC Take 1 Tab by mouth every day.     • Omega-3 Fatty Acids (FISH OIL) 1200 MG CAPS Take 1 Cap by mouth 2 Times a Day.       No current facility-administered medications for this visit.      He  has a past medical history of Chronic eczema (3/6/2015) and Hyperlipidemia (3/6/2015).    ROS   No chest pain, no shortness of breath, no abdominal pain       Objective:     /78 (BP Location: Right arm, Patient Position:  Sitting)   Pulse 82   Temp 37 °C (98.6 °F)   Ht 1.829 m (6')   Wt 83.3 kg (183 lb 9.6 oz)   SpO2 96%  Body mass index is 24.9 kg/m².   Physical Exam:  Constitutional: Alert, no distress.  Skin: Warm, dry, good turgor.  Scaly silver/yellow crusty skin lesion on left anterior knee.  Eye: Equal, round and reactive, conjunctiva clear, lids normal.  ENMT: Lips without lesions, good dentition, oropharynx clear.  Psych: Alert and oriented x3, normal affect and mood.        Assessment and Plan:   The following treatment plan was discussed    1. Abdominal aortic aneurysm (AAA) without rupture (HCC)  Slight enlargement.  Plan to recheck ultrasound in 1 year.    2. Skin lesion of left leg  New problem.  Trial of clobetasol cream.  Advised patient to follow-up with dermatology.  - clobetasol (TEMOVATE) 0.05 % Cream; Apply 1 Application to affected area(s) 2 times a day. Left knee  Dispense: 1 Tube; Refill: 0    3. Renal cyst, left  New problem.  Follow-up ultrasound in 1 year.    4. Splenic cyst  New problem.  Follow-up ultrasound in 1 year.    5. Hydrocele in adult      6. Other emphysema (HCC)  We will monitor symptoms.  He is no longer smoking.      Followup: Return if symptoms worsen or fail to improve.

## 2019-10-15 ENCOUNTER — NON-PROVIDER VISIT (OUTPATIENT)
Dept: MEDICAL GROUP | Facility: MEDICAL CENTER | Age: 69
End: 2019-10-15
Payer: MEDICARE

## 2019-10-15 DIAGNOSIS — Z23 NEED FOR VACCINATION: ICD-10-CM

## 2019-10-15 PROCEDURE — G0008 ADMIN INFLUENZA VIRUS VAC: HCPCS | Performed by: NURSE PRACTITIONER

## 2019-10-15 PROCEDURE — 90662 IIV NO PRSV INCREASED AG IM: CPT | Performed by: NURSE PRACTITIONER

## 2019-11-08 DIAGNOSIS — E78.00 PURE HYPERCHOLESTEROLEMIA: ICD-10-CM

## 2019-11-08 RX ORDER — SIMVASTATIN 20 MG
TABLET ORAL
Qty: 90 TAB | Refills: 3 | Status: SHIPPED | OUTPATIENT
Start: 2019-11-08 | End: 2020-10-28

## 2019-11-11 ENCOUNTER — NON-PROVIDER VISIT (OUTPATIENT)
Dept: MEDICAL GROUP | Facility: MEDICAL CENTER | Age: 69
End: 2019-11-11
Payer: MEDICARE

## 2019-11-11 DIAGNOSIS — Z23 NEED FOR VACCINATION: ICD-10-CM

## 2019-11-11 PROCEDURE — 90471 IMMUNIZATION ADMIN: CPT | Performed by: FAMILY MEDICINE

## 2019-11-11 PROCEDURE — 90750 HZV VACC RECOMBINANT IM: CPT | Performed by: FAMILY MEDICINE

## 2020-03-06 ENCOUNTER — OFFICE VISIT (OUTPATIENT)
Dept: MEDICAL GROUP | Facility: MEDICAL CENTER | Age: 70
End: 2020-03-06
Payer: MEDICARE

## 2020-03-06 VITALS
SYSTOLIC BLOOD PRESSURE: 124 MMHG | HEART RATE: 74 BPM | BODY MASS INDEX: 24.92 KG/M2 | OXYGEN SATURATION: 95 % | WEIGHT: 184 LBS | TEMPERATURE: 97.9 F | DIASTOLIC BLOOD PRESSURE: 70 MMHG | HEIGHT: 72 IN

## 2020-03-06 DIAGNOSIS — J43.8 OTHER EMPHYSEMA (HCC): ICD-10-CM

## 2020-03-06 DIAGNOSIS — Z11.59 NEED FOR HEPATITIS C SCREENING TEST: ICD-10-CM

## 2020-03-06 DIAGNOSIS — E78.00 PURE HYPERCHOLESTEROLEMIA: ICD-10-CM

## 2020-03-06 DIAGNOSIS — I71.40 ABDOMINAL AORTIC ANEURYSM (AAA) WITHOUT RUPTURE (HCC): ICD-10-CM

## 2020-03-06 DIAGNOSIS — Z12.5 PROSTATE CANCER SCREENING: ICD-10-CM

## 2020-03-06 DIAGNOSIS — N40.1 BENIGN PROSTATIC HYPERPLASIA WITH URINARY FREQUENCY: ICD-10-CM

## 2020-03-06 DIAGNOSIS — R35.0 BENIGN PROSTATIC HYPERPLASIA WITH URINARY FREQUENCY: ICD-10-CM

## 2020-03-06 DIAGNOSIS — Z00.00 MEDICARE ANNUAL WELLNESS VISIT, SUBSEQUENT: ICD-10-CM

## 2020-03-06 DIAGNOSIS — M47.816 SPONDYLOSIS OF LUMBAR REGION WITHOUT MYELOPATHY OR RADICULOPATHY: ICD-10-CM

## 2020-03-06 PROCEDURE — G0439 PPPS, SUBSEQ VISIT: HCPCS | Performed by: FAMILY MEDICINE

## 2020-03-06 ASSESSMENT — ENCOUNTER SYMPTOMS: GENERAL WELL-BEING: GOOD

## 2020-03-06 ASSESSMENT — PATIENT HEALTH QUESTIONNAIRE - PHQ9: CLINICAL INTERPRETATION OF PHQ2 SCORE: 0

## 2020-03-06 ASSESSMENT — ACTIVITIES OF DAILY LIVING (ADL): BATHING_REQUIRES_ASSISTANCE: 0

## 2020-03-06 NOTE — PROGRESS NOTES
Chief Complaint   Patient presents with   • Annual Exam         HPI:  Saul Calhoun Jr. is a 69 y.o. here for Medicare Annual Wellness Visit     Patient Active Problem List    Diagnosis Date Noted   • Benign prostatic hyperplasia with urinary frequency 03/06/2020   • Skin lesion of left leg 04/15/2019   • Renal cyst, left 04/15/2019   • Splenic cyst 04/15/2019   • Hydrocele in adult 04/15/2019   • Other emphysema (HCC) 04/15/2019   • Hearing aid worn 03/06/2018   • Presbycusis of both ears 03/06/2018   • Abdominal aortic aneurysm (AAA) without rupture (HCC) 03/06/2018   • Hyperlipidemia 03/06/2015   • Chronic eczema 03/06/2015   • Left shoulder pain 03/06/2015   • Hx of tobacco use, presenting hazards to health 03/06/2015   • Vitamin D insufficiency 03/06/2015       Current Outpatient Medications   Medication Sig Dispense Refill   • simvastatin (ZOCOR) 20 MG Tab TAKE ONE TABLET BY MOUTH ONE TIME DAILY EVERY EVENING. 90 Tab 3   • cetirizine (ZYRTEC) 10 MG TABS Take 1 Tab by mouth 1 time daily as needed (Eczema).     • triamcinolone acetonide (KENALOG) 0.1 % OINT Apply 1 Application to affected area(s) every day.     • Cholecalciferol (VITAMIN D) 1000 UNIT CAPS Take 1 Cap by mouth every day.     • aspirin EC (ECOTRIN) 81 MG TBEC Take 1 Tab by mouth every day.     • Omega-3 Fatty Acids (FISH OIL) 1200 MG CAPS Take 1 Cap by mouth 2 Times a Day.       No current facility-administered medications for this visit.             Current supplements as per medication list.       Allergies: Patient has no known allergies.    Current social contact/activities: lives with wife and son lives near by.     He  reports that he has quit smoking. His smoking use included cigarettes. He has a 17.50 pack-year smoking history. He has never used smokeless tobacco. He reports current alcohol use of about 7.0 oz of alcohol per week. He reports that he does not use drugs.  Counseling given: Not Answered  Comment: Quit  2012      DPA/Advanced Directive:  Patient has Advanced Directive on file.     ROS:    Gait: Uses no assistive device  Ostomy: No  Other tubes: No  Amputations: No  Chronic oxygen use: No  Last eye exam: 6 months ago.  Wears hearing aids: Yes   : Denies any urinary leakage during the last 6 months      Depression Screening    Little interest or pleasure in doing things?  0 - not at all  Feeling down, depressed , or hopeless? 0 - not at all  Patient Health Questionnaire Score: 0     If depressive symptoms identified deferred to follow up visit unless specifically addressed in assessment and plan.    Interpretation of PHQ-9 Total Score   Score Severity   1-4 No Depression   5-9 Mild Depression   10-14 Moderate Depression   15-19 Moderately Severe Depression   20-27 Severe Depression    Screening for Cognitive Impairment    Three Minute Recall (village, kitchen, baby) 3/3    Riley clock face with all 12 numbers and set the hands to show 10 past 10.  Yes    Cognitive concerns identified deferred for follow up unless specifically addressed in assessment and plan.    Fall Risk Assessment    Has the patient had two or more falls in the last year or any fall with injury in the last year?  No    Safety Assessment    Throw rugs on floor.  Yes  Handrails on all stairs.  Yes  Good lighting in all hallways.  Yes  Difficulty hearing.  Yes  Patient counseled about all safety risks that were identified.    Functional Assessment ADLs    Are there any barriers preventing you from cooking for yourself or meeting nutritional needs?  No.    Are there any barriers preventing you from driving safely or obtaining transportation?  No.    Are there any barriers preventing you from using a telephone or calling for help?  No.    Are there any barriers preventing you from shopping?  No.    Are there any barriers preventing you from taking care of your own finances?  No.    Are there any barriers preventing you from managing your medications?   No.    Are there any barriers preventing you from showering, bathing or dressing yourself?  No.    Are you currently engaging in any exercise or physical activity?  Yes.  30 minute workouts- free weights 3x weekly & cardio/yoga 2x weekly; hiking weekly 5-6 miles  What is your perception of your health?  Good.      Health Maintenance Summary                HEPATITIS C SCREENING Overdue 1952     Annual Pulmonary Function Test / Spirometry Overdue 5/6/1958     Annual Wellness Visit Overdue 3/6/2020      Done 3/6/2019 Visit Dx: Medicare annual wellness visit, subsequent     Patient has more history with this topic...    IMM DTaP/Tdap/Td Vaccine Next Due 2/23/2026      Done 2/23/2016 Imm Admin: Tdap Vaccine    COLONOSCOPY Next Due 6/17/2029      Done 6/17/2019 REFERRAL TO GI FOR COLONOSCOPY     Patient has more history with this topic...          Patient Care Team:  Naun Moody M.D. as PCP - General (Family Medicine)        Social History     Tobacco Use   • Smoking status: Former Smoker     Packs/day: 0.50     Years: 35.00     Pack years: 17.50     Types: Cigarettes   • Smokeless tobacco: Never Used   • Tobacco comment: Quit 2012   Substance Use Topics   • Alcohol use: Yes     Alcohol/week: 7.0 oz     Types: 14 Cans of beer per week   • Drug use: No     Family History   Problem Relation Age of Onset   • Hyperlipidemia Mother    • Hyperlipidemia Maternal Uncle    • Heart Disease Maternal Uncle 50     He  has a past medical history of Chronic eczema (3/6/2015) and Hyperlipidemia (3/6/2015).   Past Surgical History:   Procedure Laterality Date   • DRAINAGE HEMATOMA  1968    Right groin       Exam:   /70 (BP Location: Right arm, Patient Position: Sitting)   Pulse 74   Temp 36.6 °C (97.9 °F)   Ht 1.829 m (6')   Wt 83.5 kg (184 lb)   SpO2 95%  Body mass index is 24.95 kg/m².    Constitutional: Alert, no distress.  Skin: Warm, dry, good turgor, no rashes in visible areas.  Eye: Equal, round and reactive,  conjunctiva clear, lids normal.  ENMT: Lips without lesions, good dentition, oropharynx clear. TMs pearly gray bilaterally.  Neck: Trachea midline, no masses, no thyromegaly. No cervical or supraclavicular lymphadenopathy  Respiratory: Unlabored respiratory effort, lungs clear to auscultation, no wheezes, no ronchi.  Cardiovascular: Normal S1, S2, no murmur, no edema.  Psych: Alert and oriented x3, normal affect and mood.      Assessment and Plan. The following treatment and monitoring plan is recommended:    1. Medicare annual wellness visit, subsequent  - Subsequent Annual Wellness Visit - Includes PPPS ()    2. Abdominal aortic aneurysm (AAA) without rupture (HCC)  Check AAA. If no change this year, consider every 1-2 years.  - US-ABDOMINAL AORTA W/O DOPPLER  - Subsequent Annual Wellness Visit - Includes PPPS ()    3. Other emphysema (HCC)  Asymptomatic. Continue to monitor. Consider PFT's if symptoms appear.  - Subsequent Annual Wellness Visit - Includes PPPS ()    4. Pure hypercholesterolemia  Check labs and call with results. Continue simvastatin.  - Comp Metabolic Panel; Future  - Lipid Profile; Future  - TSH WITH REFLEX TO FT4; Future  - Subsequent Annual Wellness Visit - Includes PPPS ()    5. Benign prostatic hyperplasia with urinary frequency  Declines starting medication.  - Subsequent Annual Wellness Visit - Includes PPPS ()    6. Prostate cancer screening  - PROSTATE SPECIFIC AG SCREENING; Future  - Subsequent Annual Wellness Visit - Includes PPPS ()    7. Need for hepatitis C screening test  - HCV Scrn ( [id number] 1xLife); Future  - Subsequent Annual Wellness Visit - Includes PPPS ()    8. Spondylosis of lumbar region without myelopathy or radiculopathy  Still doing well with minimal pain. Continue regular exercises and stretching.  - Subsequent Annual Wellness Visit - Includes PPPS ()          Services suggested: No services needed at this time  Health Care  Screening: Age-appropriate preventive services recommended by USPTF and ACIP covered by Medicare were discussed today. Services ordered if indicated and agreed upon by the patient.  Referrals offered: Community-based lifestyle interventions to reduce health risks and promote self-management and wellness, fall prevention, nutrition, physical activity, tobacco-use cessation, weight loss, and mental health services as per orders if indicated.    Discussion today about general wellness and lifestyle habits:    · Prevent falls and reduce trip hazards; Cautioned about securing or removing rugs.  · Have a working fire alarm and carbon monoxide detector;   · Engage in regular physical activity and social activities     Follow-up: Return in about 1 year (around 3/6/2021) for Annual.

## 2020-03-20 ENCOUNTER — HOSPITAL ENCOUNTER (OUTPATIENT)
Dept: LAB | Facility: MEDICAL CENTER | Age: 70
End: 2020-03-20
Attending: FAMILY MEDICINE
Payer: MEDICARE

## 2020-03-20 ENCOUNTER — HOSPITAL ENCOUNTER (OUTPATIENT)
Dept: RADIOLOGY | Facility: MEDICAL CENTER | Age: 70
End: 2020-03-20
Attending: FAMILY MEDICINE
Payer: MEDICARE

## 2020-03-20 DIAGNOSIS — Z11.59 NEED FOR HEPATITIS C SCREENING TEST: ICD-10-CM

## 2020-03-20 DIAGNOSIS — Z12.5 PROSTATE CANCER SCREENING: ICD-10-CM

## 2020-03-20 DIAGNOSIS — E78.00 PURE HYPERCHOLESTEROLEMIA: ICD-10-CM

## 2020-03-20 LAB
ALBUMIN SERPL BCP-MCNC: 4.6 G/DL (ref 3.2–4.9)
ALBUMIN/GLOB SERPL: 1.5 G/DL
ALP SERPL-CCNC: 50 U/L (ref 30–99)
ALT SERPL-CCNC: 21 U/L (ref 2–50)
ANION GAP SERPL CALC-SCNC: 12 MMOL/L (ref 7–16)
AST SERPL-CCNC: 21 U/L (ref 12–45)
BILIRUB SERPL-MCNC: 0.6 MG/DL (ref 0.1–1.5)
BUN SERPL-MCNC: 11 MG/DL (ref 8–22)
CALCIUM SERPL-MCNC: 9.7 MG/DL (ref 8.4–10.2)
CHLORIDE SERPL-SCNC: 104 MMOL/L (ref 96–112)
CHOLEST SERPL-MCNC: 181 MG/DL (ref 100–199)
CO2 SERPL-SCNC: 27 MMOL/L (ref 20–33)
CREAT SERPL-MCNC: 0.75 MG/DL (ref 0.5–1.4)
FASTING STATUS PATIENT QL REPORTED: NORMAL
GLOBULIN SER CALC-MCNC: 3 G/DL (ref 1.9–3.5)
GLUCOSE SERPL-MCNC: 95 MG/DL (ref 65–99)
HDLC SERPL-MCNC: 62 MG/DL
LDLC SERPL CALC-MCNC: 103 MG/DL
POTASSIUM SERPL-SCNC: 4.5 MMOL/L (ref 3.6–5.5)
PROT SERPL-MCNC: 7.6 G/DL (ref 6–8.2)
SODIUM SERPL-SCNC: 143 MMOL/L (ref 135–145)
TRIGL SERPL-MCNC: 81 MG/DL (ref 0–149)
TSH SERPL DL<=0.005 MIU/L-ACNC: 2.81 UIU/ML (ref 0.38–5.33)

## 2020-03-20 PROCEDURE — G0472 HEP C SCREEN HIGH RISK/OTHER: HCPCS

## 2020-03-20 PROCEDURE — 76775 US EXAM ABDO BACK WALL LIM: CPT

## 2020-03-20 PROCEDURE — 80053 COMPREHEN METABOLIC PANEL: CPT

## 2020-03-20 PROCEDURE — 80061 LIPID PANEL: CPT

## 2020-03-20 PROCEDURE — 36415 COLL VENOUS BLD VENIPUNCTURE: CPT

## 2020-03-20 PROCEDURE — 84153 ASSAY OF PSA TOTAL: CPT

## 2020-03-20 PROCEDURE — 84443 ASSAY THYROID STIM HORMONE: CPT

## 2020-03-22 LAB
HCV AB SER QL: NORMAL
PSA SERPL-MCNC: 2.88 NG/ML (ref 0–4)

## 2020-06-12 ENCOUNTER — OFFICE VISIT (OUTPATIENT)
Dept: MEDICAL GROUP | Facility: MEDICAL CENTER | Age: 70
End: 2020-06-12
Payer: MEDICARE

## 2020-06-12 ENCOUNTER — HOSPITAL ENCOUNTER (OUTPATIENT)
Dept: LAB | Facility: MEDICAL CENTER | Age: 70
End: 2020-06-12
Attending: FAMILY MEDICINE
Payer: MEDICARE

## 2020-06-12 VITALS
SYSTOLIC BLOOD PRESSURE: 138 MMHG | WEIGHT: 172 LBS | TEMPERATURE: 98 F | DIASTOLIC BLOOD PRESSURE: 84 MMHG | HEART RATE: 77 BPM | OXYGEN SATURATION: 97 % | BODY MASS INDEX: 23.3 KG/M2 | HEIGHT: 72 IN

## 2020-06-12 DIAGNOSIS — R10.13 EPIGASTRIC PAIN: ICD-10-CM

## 2020-06-12 PROCEDURE — 83013 H PYLORI (C-13) BREATH: CPT

## 2020-06-12 PROCEDURE — 99214 OFFICE O/P EST MOD 30 MIN: CPT | Performed by: FAMILY MEDICINE

## 2020-06-12 RX ORDER — OMEPRAZOLE 40 MG/1
40 CAPSULE, DELAYED RELEASE ORAL 2 TIMES DAILY
Qty: 60 CAP | Refills: 2 | Status: SHIPPED | OUTPATIENT
Start: 2020-06-12

## 2020-06-12 NOTE — PROGRESS NOTES
Subjective:   Saul Calhoun Jr. is a 70 y.o. male here today for abdominal pain    Has been having abdominal pain for 3-4 weeks since a possible black  sting on back of scalp.  He feels like food and even water has difficulty getting down his esophagus. Sometimes he spits up foam. Food is feeling it is not moving out of his stomach.  There is genearlized abdominal pain on and off, today is not too bad  He has lost appetite. Eating spaghetti and greasy foods made his symptoms worse.  Has been having increased stress recently with oxygen dependent son who lives in a group home, having trouble.  Has a history of GERD when eating eggs, which was treated with Zantac.    Current medicines (including changes today)  Current Outpatient Medications   Medication Sig Dispense Refill   • simvastatin (ZOCOR) 20 MG Tab TAKE ONE TABLET BY MOUTH ONE TIME DAILY EVERY EVENING. 90 Tab 3   • cetirizine (ZYRTEC) 10 MG TABS Take 1 Tab by mouth 1 time daily as needed (Eczema).     • triamcinolone acetonide (KENALOG) 0.1 % OINT Apply 1 Application to affected area(s) every day.     • Cholecalciferol (VITAMIN D) 1000 UNIT CAPS Take 1 Cap by mouth every day.     • aspirin EC (ECOTRIN) 81 MG TBEC Take 1 Tab by mouth every day.     • Omega-3 Fatty Acids (FISH OIL) 1200 MG CAPS Take 1 Cap by mouth 2 Times a Day.       No current facility-administered medications for this visit.      He  has a past medical history of Chronic eczema (3/6/2015) and Hyperlipidemia (3/6/2015).    ROS   No GERD, + indigestion, no diarrhea, no constipation.       Objective:     /84 (BP Location: Right arm, Patient Position: Sitting, BP Cuff Size: Adult)   Pulse 77   Temp 36.7 °C (98 °F) (Temporal)   Ht 1.829 m (6')   Wt 78 kg (172 lb)   SpO2 97%  Body mass index is 23.33 kg/m².   Physical Exam:  Constitutional: Alert, no distress.  Skin: Warm, dry, good turgor, no rashes in visible areas.  Eye: Equal, round and reactive, conjunctiva clear, lids  normal.  Abdomen: Soft, non-tender, no masses, no hepatosplenomegaly.  Psych: Alert and oriented x3, normal affect and mood.        Assessment and Plan:   The following treatment plan was discussed    1. Epigastric pain  New problem. Check H. Pylori. Start omeprazole 40 mg twice daily.  - H. PYLORI BREATH TEST  - omeprazole (PRILOSEC) 40 MG delayed-release capsule; Take 1 Cap by mouth 2 times a day.  Dispense: 60 Cap; Refill: 2      Followup: Return if symptoms worsen or fail to improve.

## 2020-06-13 LAB — UREA BREATH TEST QL: NEGATIVE

## 2020-06-15 ENCOUNTER — TELEPHONE (OUTPATIENT)
Dept: MEDICAL GROUP | Facility: MEDICAL CENTER | Age: 70
End: 2020-06-15

## 2020-06-15 NOTE — TELEPHONE ENCOUNTER
----- Message from Naun Moody M.D. sent at 6/15/2020  7:19 AM PDT -----  Please notify patient that his H. pylori test is negative.  He should proceed with taking omeprazole twice daily until his symptoms improve and he should go down to once daily.  Naun Moody M.D.

## 2020-09-29 ENCOUNTER — NON-PROVIDER VISIT (OUTPATIENT)
Dept: MEDICAL GROUP | Facility: MEDICAL CENTER | Age: 70
End: 2020-09-29
Payer: MEDICARE

## 2020-09-29 DIAGNOSIS — Z23 NEED FOR VACCINATION: ICD-10-CM

## 2020-09-29 PROCEDURE — 90662 IIV NO PRSV INCREASED AG IM: CPT | Performed by: FAMILY MEDICINE

## 2020-09-29 PROCEDURE — G0008 ADMIN INFLUENZA VIRUS VAC: HCPCS | Performed by: FAMILY MEDICINE

## 2020-09-29 NOTE — PROGRESS NOTES
"Drake Candelaria Unique . is a 70 y.o. male here for a non-provider visit for:   FLU    Reason for immunization: Annual Flu Vaccine  Immunization records indicate need for vaccine: Yes, confirmed with Epic  Minimum interval has been met for this vaccine: Yes  ABN completed: Not Indicated    Order and dose verified by: ANDREIA  VIS Dated  8/15/19 was given to patient: Yes  All IAC Questionnaire questions were answered \"No.\"    Patient tolerated injection and no adverse effects were observed or reported: Yes    Pt scheduled for next dose in series: No        "

## 2021-03-03 DIAGNOSIS — Z23 NEED FOR VACCINATION: ICD-10-CM

## 2022-04-13 ENCOUNTER — HOSPITAL ENCOUNTER (OUTPATIENT)
Dept: LAB | Facility: MEDICAL CENTER | Age: 72
End: 2022-04-13
Attending: FAMILY MEDICINE
Payer: MEDICARE

## 2022-04-13 LAB
ALBUMIN SERPL BCP-MCNC: 4.7 G/DL (ref 3.2–4.9)
ALBUMIN/GLOB SERPL: 1.6 G/DL
ALP SERPL-CCNC: 64 U/L (ref 30–99)
ALT SERPL-CCNC: 23 U/L (ref 2–50)
ANION GAP SERPL CALC-SCNC: 11 MMOL/L (ref 7–16)
AST SERPL-CCNC: 21 U/L (ref 12–45)
BILIRUB SERPL-MCNC: 0.7 MG/DL (ref 0.1–1.5)
BUN SERPL-MCNC: 12 MG/DL (ref 8–22)
CALCIUM SERPL-MCNC: 9.3 MG/DL (ref 8.4–10.2)
CHLORIDE SERPL-SCNC: 103 MMOL/L (ref 96–112)
CHOLEST SERPL-MCNC: 185 MG/DL (ref 100–199)
CO2 SERPL-SCNC: 25 MMOL/L (ref 20–33)
CREAT SERPL-MCNC: 0.74 MG/DL (ref 0.5–1.4)
FASTING STATUS PATIENT QL REPORTED: NORMAL
GFR SERPLBLD CREATININE-BSD FMLA CKD-EPI: 98 ML/MIN/1.73 M 2
GLOBULIN SER CALC-MCNC: 3 G/DL (ref 1.9–3.5)
GLUCOSE SERPL-MCNC: 95 MG/DL (ref 65–99)
HDLC SERPL-MCNC: 53 MG/DL
LDLC SERPL CALC-MCNC: 100 MG/DL
POTASSIUM SERPL-SCNC: 4.3 MMOL/L (ref 3.6–5.5)
PROT SERPL-MCNC: 7.7 G/DL (ref 6–8.2)
SODIUM SERPL-SCNC: 139 MMOL/L (ref 135–145)
TRIGL SERPL-MCNC: 159 MG/DL (ref 0–149)
TSH SERPL DL<=0.005 MIU/L-ACNC: 2.55 UIU/ML (ref 0.38–5.33)

## 2022-04-13 PROCEDURE — 84443 ASSAY THYROID STIM HORMONE: CPT

## 2022-04-13 PROCEDURE — 80061 LIPID PANEL: CPT

## 2022-04-13 PROCEDURE — 36415 COLL VENOUS BLD VENIPUNCTURE: CPT

## 2022-04-13 PROCEDURE — 84153 ASSAY OF PSA TOTAL: CPT | Mod: GA

## 2022-04-13 PROCEDURE — 80053 COMPREHEN METABOLIC PANEL: CPT

## 2022-04-14 LAB — PSA SERPL-MCNC: 2.53 NG/ML (ref 0–4)

## 2022-05-03 ENCOUNTER — HOSPITAL ENCOUNTER (OUTPATIENT)
Dept: RADIOLOGY | Facility: MEDICAL CENTER | Age: 72
End: 2022-05-03
Attending: FAMILY MEDICINE
Payer: MEDICARE

## 2022-05-03 DIAGNOSIS — L97.222 NON-PRESSURE CHRONIC ULCER OF LEFT CALF WITH FAT LAYER EXPOSED (HCC): ICD-10-CM

## 2022-05-03 PROCEDURE — 73590 X-RAY EXAM OF LOWER LEG: CPT | Mod: LT

## 2022-06-14 ENCOUNTER — HOSPITAL ENCOUNTER (OUTPATIENT)
Dept: RADIOLOGY | Facility: MEDICAL CENTER | Age: 72
End: 2022-06-14
Attending: FAMILY MEDICINE
Payer: MEDICARE

## 2022-06-14 DIAGNOSIS — L97.222 NON-PRESSURE CHRONIC ULCER OF LEFT CALF WITH FAT LAYER EXPOSED (HCC): ICD-10-CM

## 2022-06-14 DIAGNOSIS — I87.2 PERIPHERAL VENOUS INSUFFICIENCY: ICD-10-CM

## 2022-06-14 DIAGNOSIS — L97.212 NON-PRESSURE CHRONIC ULCER OF RIGHT CALF WITH FAT LAYER EXPOSED (HCC): ICD-10-CM

## 2022-06-14 PROCEDURE — 93970 EXTREMITY STUDY: CPT

## 2022-06-20 ENCOUNTER — OFFICE VISIT (OUTPATIENT)
Dept: URGENT CARE | Facility: CLINIC | Age: 72
End: 2022-06-20
Payer: MEDICARE

## 2022-06-20 ENCOUNTER — HOSPITAL ENCOUNTER (OUTPATIENT)
Dept: RADIOLOGY | Facility: MEDICAL CENTER | Age: 72
End: 2022-06-20
Attending: PHYSICIAN ASSISTANT
Payer: MEDICARE

## 2022-06-20 VITALS
HEART RATE: 71 BPM | WEIGHT: 175 LBS | BODY MASS INDEX: 23.7 KG/M2 | OXYGEN SATURATION: 97 % | DIASTOLIC BLOOD PRESSURE: 90 MMHG | TEMPERATURE: 98 F | SYSTOLIC BLOOD PRESSURE: 132 MMHG | RESPIRATION RATE: 18 BRPM | HEIGHT: 72 IN

## 2022-06-20 DIAGNOSIS — R10.32 LEFT INGUINAL PAIN: ICD-10-CM

## 2022-06-20 DIAGNOSIS — K40.90 NON-RECURRENT UNILATERAL INGUINAL HERNIA WITHOUT OBSTRUCTION OR GANGRENE: ICD-10-CM

## 2022-06-20 DIAGNOSIS — U07.1 COVID-19: ICD-10-CM

## 2022-06-20 LAB
APPEARANCE UR: CLEAR
BILIRUB UR STRIP-MCNC: NEGATIVE MG/DL
COLOR UR AUTO: YELLOW
GLUCOSE UR STRIP.AUTO-MCNC: NEGATIVE MG/DL
KETONES UR STRIP.AUTO-MCNC: NEGATIVE MG/DL
LEUKOCYTE ESTERASE UR QL STRIP.AUTO: NEGATIVE
NITRITE UR QL STRIP.AUTO: NEGATIVE
PH UR STRIP.AUTO: 6 [PH] (ref 5–8)
PROT UR QL STRIP: NEGATIVE MG/DL
RBC UR QL AUTO: NEGATIVE
SP GR UR STRIP.AUTO: 1.02
UROBILINOGEN UR STRIP-MCNC: 0.2 MG/DL

## 2022-06-20 PROCEDURE — 76857 US EXAM PELVIC LIMITED: CPT

## 2022-06-20 PROCEDURE — 99214 OFFICE O/P EST MOD 30 MIN: CPT | Performed by: PHYSICIAN ASSISTANT

## 2022-06-20 PROCEDURE — 81002 URINALYSIS NONAUTO W/O SCOPE: CPT | Performed by: PHYSICIAN ASSISTANT

## 2022-06-20 ASSESSMENT — ENCOUNTER SYMPTOMS
FLANK PAIN: 0
VOMITING: 0
NAUSEA: 0
ABDOMINAL PAIN: 1
DIARRHEA: 0

## 2022-06-20 NOTE — PROGRESS NOTES
Subjective:   Saul Calhoun Jr. is a 72 y.o. male who presents today with   Chief Complaint   Patient presents with   • Bump     X3-4 days, Groin area on the left side, swollen, very painful, tested positive for Covid-19 2 days ago      Other  This is a new problem. Episode onset: 3 days. The problem occurs constantly. The problem has been unchanged. Associated symptoms include abdominal pain (left inguinal). Pertinent negatives include no nausea, urinary symptoms or vomiting. He has tried nothing for the symptoms. The treatment provided no relief.   COVID + 2 days ago.  Patient states his COVID related symptoms have been significantly improving and he feels better from when he tested positive on Saturday.  No specific injury or trauma leading up to left inguinal pain.  PMH:  has a past medical history of Chronic eczema (3/6/2015) and Hyperlipidemia (3/6/2015).  MEDS:   Current Outpatient Medications:   •  simvastatin (ZOCOR) 20 MG Tab, TAKE ONE TABLET BY MOUTH ONE TIME DAILY IN THE EVENING, Disp: 90 Tab, Rfl: 3  •  omeprazole (PRILOSEC) 40 MG delayed-release capsule, Take 1 Cap by mouth 2 times a day., Disp: 60 Cap, Rfl: 2  •  cetirizine (ZYRTEC) 10 MG TABS, Take 1 Tab by mouth 1 time daily as needed (Eczema)., Disp: , Rfl:   •  triamcinolone acetonide (KENALOG) 0.1 % OINT, Apply 1 Application to affected area(s) every day., Disp: , Rfl:   •  Cholecalciferol (VITAMIN D) 1000 UNIT CAPS, Take 1 Cap by mouth every day., Disp: , Rfl:   •  aspirin EC (ECOTRIN) 81 MG TBEC, Take 1 Tab by mouth every day., Disp: , Rfl:   •  Omega-3 Fatty Acids (FISH OIL) 1200 MG CAPS, Take 1 Cap by mouth 2 Times a Day., Disp: , Rfl:   ALLERGIES: No Known Allergies  SURGHX:   Past Surgical History:   Procedure Laterality Date   • DRAINAGE HEMATOMA  1968    Right groin     SOCHX:  reports that he has quit smoking. His smoking use included cigarettes. He has a 17.50 pack-year smoking history. He has never used smokeless tobacco. He  reports current alcohol use of about 8.4 oz of alcohol per week. He reports that he does not use drugs.  FH: Reviewed with patient, not pertinent to this visit.     Review of Systems   Gastrointestinal: Positive for abdominal pain (left inguinal). Negative for diarrhea, nausea and vomiting.   Genitourinary: Negative for dysuria, flank pain, frequency, hematuria and urgency.      Objective:   BP (!) 132/90   Pulse 71   Temp 36.7 °C (98 °F) (Temporal)   Resp 18   Ht 1.829 m (6')   Wt 79.4 kg (175 lb)   SpO2 97%   BMI 23.73 kg/m²   Physical Exam  Vitals and nursing note reviewed.   Constitutional:       General: He is not in acute distress.     Appearance: Normal appearance. He is well-developed. He is not ill-appearing or toxic-appearing.   HENT:      Head: Normocephalic and atraumatic.      Right Ear: Hearing normal.      Left Ear: Hearing normal.   Eyes:      Pupils: Pupils are equal, round, and reactive to light.   Cardiovascular:      Rate and Rhythm: Normal rate.   Pulmonary:      Effort: Pulmonary effort is normal.   Abdominal:      General: There is no distension.      Tenderness: There is no abdominal tenderness. There is no guarding.      Hernia: A hernia (Reducible) is present. Hernia is present in the left inguinal area.   Genitourinary:     Penis: Normal.       Testes:         Left: Mass, tenderness or swelling not present.   Musculoskeletal:      Comments: Normal movement in all 4 extremities   Skin:     General: Skin is warm and dry.   Neurological:      Mental Status: He is alert.      Coordination: Coordination normal.   Psychiatric:         Mood and Affect: Mood normal.     US   FINDINGS:     There is a 2.5 x 3.2 cm fat-containing reducible left inguinal hernia. The hernia neck measures 1 cm.     There is no mass or fluid collection.        IMPRESSION:     Fat-containing reducible left inguinal hernia.    UA negative    Assessment/Plan:   Assessment    1. Left inguinal pain  - POCT  Urinalysis  - US-INGUINAL HERNIA; Future    2. COVID-19    3. Non-recurrent unilateral inguinal hernia without obstruction or gangrene  - Referral to General Surgery   Symptoms and presentation consistent with left inguinal hernia.  Offered antiviral treatment at this time but patient declined. Will obtain ultrasound and follow-up.  Recommend patient follow-up with general surgeon for consultation.  Avoid any heavy lifting or activity that can make the hernia worse.  ER precautions with any significant worsening of symptoms as we discussed or if it becomes nonreducible.  Differential diagnosis, natural history, supportive care, and indications for immediate follow-up discussed.   Patient given instructions and understanding of medications and treatment.    If not improving in 3-5 days, F/U with PCP or return to UC if symptoms worsen.    Patient agreeable to plan.  Greater than 30 minutes were spent reviewing patient's chart, examining and obtaining history from patient, and discussing plan of care.     Please note that this dictation was created using voice recognition software. I have made every reasonable attempt to correct obvious errors, but I expect that there are errors of grammar and possibly content that I did not discover before finalizing the note.    Blanco Hughes PA-C

## 2022-11-08 ENCOUNTER — PATIENT MESSAGE (OUTPATIENT)
Dept: HEALTH INFORMATION MANAGEMENT | Facility: OTHER | Age: 72
End: 2022-11-08

## 2023-04-20 ENCOUNTER — HOSPITAL ENCOUNTER (OUTPATIENT)
Dept: LAB | Facility: MEDICAL CENTER | Age: 73
End: 2023-04-20
Attending: FAMILY MEDICINE
Payer: MEDICARE

## 2023-04-20 LAB
ALBUMIN SERPL BCP-MCNC: 4.4 G/DL (ref 3.2–4.9)
ALBUMIN/GLOB SERPL: 1.6 G/DL
ALP SERPL-CCNC: 56 U/L (ref 30–99)
ALT SERPL-CCNC: 24 U/L (ref 2–50)
ANION GAP SERPL CALC-SCNC: 10 MMOL/L (ref 7–16)
AST SERPL-CCNC: 24 U/L (ref 12–45)
BILIRUB SERPL-MCNC: 0.6 MG/DL (ref 0.1–1.5)
BUN SERPL-MCNC: 12 MG/DL (ref 8–22)
CALCIUM ALBUM COR SERPL-MCNC: 8.7 MG/DL (ref 8.5–10.5)
CALCIUM SERPL-MCNC: 9 MG/DL (ref 8.4–10.2)
CHLORIDE SERPL-SCNC: 102 MMOL/L (ref 96–112)
CHOLEST SERPL-MCNC: 174 MG/DL (ref 100–199)
CO2 SERPL-SCNC: 25 MMOL/L (ref 20–33)
CREAT SERPL-MCNC: 0.68 MG/DL (ref 0.5–1.4)
FASTING STATUS PATIENT QL REPORTED: NORMAL
GFR SERPLBLD CREATININE-BSD FMLA CKD-EPI: 99 ML/MIN/1.73 M 2
GLOBULIN SER CALC-MCNC: 2.8 G/DL (ref 1.9–3.5)
GLUCOSE SERPL-MCNC: 91 MG/DL (ref 65–99)
HDLC SERPL-MCNC: 59 MG/DL
LDLC SERPL CALC-MCNC: 97 MG/DL
POTASSIUM SERPL-SCNC: 4.5 MMOL/L (ref 3.6–5.5)
PROT SERPL-MCNC: 7.2 G/DL (ref 6–8.2)
PSA SERPL-MCNC: 3.04 NG/ML (ref 0–4)
SODIUM SERPL-SCNC: 137 MMOL/L (ref 135–145)
TRIGL SERPL-MCNC: 88 MG/DL (ref 0–149)
TSH SERPL DL<=0.005 MIU/L-ACNC: 2.52 UIU/ML (ref 0.38–5.33)

## 2023-04-20 PROCEDURE — 84153 ASSAY OF PSA TOTAL: CPT | Mod: GA

## 2023-04-20 PROCEDURE — 84443 ASSAY THYROID STIM HORMONE: CPT

## 2023-04-20 PROCEDURE — 80053 COMPREHEN METABOLIC PANEL: CPT

## 2023-04-20 PROCEDURE — 36415 COLL VENOUS BLD VENIPUNCTURE: CPT

## 2023-04-20 PROCEDURE — 80061 LIPID PANEL: CPT

## 2024-10-02 ENCOUNTER — HOSPITAL ENCOUNTER (OUTPATIENT)
Dept: LAB | Facility: MEDICAL CENTER | Age: 74
End: 2024-10-02
Attending: STUDENT IN AN ORGANIZED HEALTH CARE EDUCATION/TRAINING PROGRAM
Payer: MEDICARE

## 2024-10-02 LAB
ALBUMIN SERPL BCP-MCNC: 4.2 G/DL (ref 3.2–4.9)
ALBUMIN/GLOB SERPL: 1.3 G/DL
ALP SERPL-CCNC: 58 U/L (ref 30–99)
ALT SERPL-CCNC: 24 U/L (ref 2–50)
ANION GAP SERPL CALC-SCNC: 12 MMOL/L (ref 7–16)
AST SERPL-CCNC: 23 U/L (ref 12–45)
BASOPHILS # BLD AUTO: 1.1 % (ref 0–1.8)
BASOPHILS # BLD: 0.06 K/UL (ref 0–0.12)
BILIRUB SERPL-MCNC: 0.6 MG/DL (ref 0.1–1.5)
BUN SERPL-MCNC: 12 MG/DL (ref 8–22)
CALCIUM ALBUM COR SERPL-MCNC: 8.9 MG/DL (ref 8.5–10.5)
CALCIUM SERPL-MCNC: 9.1 MG/DL (ref 8.4–10.2)
CHLORIDE SERPL-SCNC: 102 MMOL/L (ref 96–112)
CHOLEST SERPL-MCNC: 183 MG/DL (ref 100–199)
CO2 SERPL-SCNC: 24 MMOL/L (ref 20–33)
CREAT SERPL-MCNC: 0.72 MG/DL (ref 0.5–1.4)
EOSINOPHIL # BLD AUTO: 0.17 K/UL (ref 0–0.51)
EOSINOPHIL NFR BLD: 3 % (ref 0–6.9)
ERYTHROCYTE [DISTWIDTH] IN BLOOD BY AUTOMATED COUNT: 43.2 FL (ref 35.9–50)
EST. AVERAGE GLUCOSE BLD GHB EST-MCNC: 108 MG/DL
FASTING STATUS PATIENT QL REPORTED: NORMAL
GFR SERPLBLD CREATININE-BSD FMLA CKD-EPI: 97 ML/MIN/1.73 M 2
GLOBULIN SER CALC-MCNC: 3.2 G/DL (ref 1.9–3.5)
GLUCOSE SERPL-MCNC: 96 MG/DL (ref 65–99)
HBA1C MFR BLD: 5.4 % (ref 4–5.6)
HCT VFR BLD AUTO: 47.3 % (ref 42–52)
HDLC SERPL-MCNC: 58 MG/DL
HGB BLD-MCNC: 16.1 G/DL (ref 14–18)
IMM GRANULOCYTES # BLD AUTO: 0.06 K/UL (ref 0–0.11)
IMM GRANULOCYTES NFR BLD AUTO: 1.1 % (ref 0–0.9)
LDLC SERPL CALC-MCNC: 100 MG/DL
LYMPHOCYTES # BLD AUTO: 1.68 K/UL (ref 1–4.8)
LYMPHOCYTES NFR BLD: 29.5 % (ref 22–41)
MCH RBC QN AUTO: 31.4 PG (ref 27–33)
MCHC RBC AUTO-ENTMCNC: 34 G/DL (ref 32.3–36.5)
MCV RBC AUTO: 92.4 FL (ref 81.4–97.8)
MONOCYTES # BLD AUTO: 0.75 K/UL (ref 0–0.85)
MONOCYTES NFR BLD AUTO: 13.2 % (ref 0–13.4)
NEUTROPHILS # BLD AUTO: 2.98 K/UL (ref 1.82–7.42)
NEUTROPHILS NFR BLD: 52.1 % (ref 44–72)
NRBC # BLD AUTO: 0 K/UL
NRBC BLD-RTO: 0 /100 WBC (ref 0–0.2)
PLATELET # BLD AUTO: 309 K/UL (ref 164–446)
PMV BLD AUTO: 10.5 FL (ref 9–12.9)
POTASSIUM SERPL-SCNC: 4.4 MMOL/L (ref 3.6–5.5)
PROT SERPL-MCNC: 7.4 G/DL (ref 6–8.2)
RBC # BLD AUTO: 5.12 M/UL (ref 4.7–6.1)
SODIUM SERPL-SCNC: 138 MMOL/L (ref 135–145)
TRIGL SERPL-MCNC: 123 MG/DL (ref 0–149)
TSH SERPL DL<=0.005 MIU/L-ACNC: 2.63 UIU/ML (ref 0.38–5.33)
WBC # BLD AUTO: 5.7 K/UL (ref 4.8–10.8)

## 2024-10-02 PROCEDURE — 80053 COMPREHEN METABOLIC PANEL: CPT

## 2024-10-02 PROCEDURE — 85025 COMPLETE CBC W/AUTO DIFF WBC: CPT

## 2024-10-02 PROCEDURE — 83036 HEMOGLOBIN GLYCOSYLATED A1C: CPT | Mod: GA

## 2024-10-02 PROCEDURE — 80061 LIPID PANEL: CPT

## 2024-10-02 PROCEDURE — 84443 ASSAY THYROID STIM HORMONE: CPT

## 2024-10-02 PROCEDURE — 36415 COLL VENOUS BLD VENIPUNCTURE: CPT
